# Patient Record
Sex: FEMALE | Race: WHITE | Employment: UNEMPLOYED | URBAN - NONMETROPOLITAN AREA
[De-identification: names, ages, dates, MRNs, and addresses within clinical notes are randomized per-mention and may not be internally consistent; named-entity substitution may affect disease eponyms.]

---
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
## 2025-04-08 ENCOUNTER — OFFICE VISIT (OUTPATIENT)
Dept: FAMILY MEDICINE CLINIC | Age: 20
End: 2025-04-08
Payer: COMMERCIAL

## 2025-04-08 ENCOUNTER — TELEPHONE (OUTPATIENT)
Dept: FAMILY MEDICINE CLINIC | Age: 20
End: 2025-04-08

## 2025-04-08 VITALS
HEIGHT: 64 IN | BODY MASS INDEX: 36.26 KG/M2 | TEMPERATURE: 98 F | RESPIRATION RATE: 18 BRPM | SYSTOLIC BLOOD PRESSURE: 128 MMHG | HEART RATE: 94 BPM | WEIGHT: 212.4 LBS | OXYGEN SATURATION: 99 % | DIASTOLIC BLOOD PRESSURE: 88 MMHG

## 2025-04-08 DIAGNOSIS — Z00.00 WELL ADULT EXAM: Primary | ICD-10-CM

## 2025-04-08 DIAGNOSIS — Z23 NEED FOR TDAP VACCINATION: ICD-10-CM

## 2025-04-08 PROCEDURE — 99385 PREV VISIT NEW AGE 18-39: CPT

## 2025-04-08 PROCEDURE — 90471 IMMUNIZATION ADMIN: CPT

## 2025-04-08 PROCEDURE — 90715 TDAP VACCINE 7 YRS/> IM: CPT

## 2025-04-08 SDOH — ECONOMIC STABILITY: FOOD INSECURITY: WITHIN THE PAST 12 MONTHS, YOU WORRIED THAT YOUR FOOD WOULD RUN OUT BEFORE YOU GOT MONEY TO BUY MORE.: NEVER TRUE

## 2025-04-08 SDOH — ECONOMIC STABILITY: FOOD INSECURITY: WITHIN THE PAST 12 MONTHS, THE FOOD YOU BOUGHT JUST DIDN'T LAST AND YOU DIDN'T HAVE MONEY TO GET MORE.: NEVER TRUE

## 2025-04-08 ASSESSMENT — PATIENT HEALTH QUESTIONNAIRE - PHQ9
1. LITTLE INTEREST OR PLEASURE IN DOING THINGS: NOT AT ALL
2. FEELING DOWN, DEPRESSED OR HOPELESS: NOT AT ALL
SUM OF ALL RESPONSES TO PHQ QUESTIONS 1-9: 0

## 2025-04-11 ASSESSMENT — ENCOUNTER SYMPTOMS
COUGH: 0
CONSTIPATION: 0
ABDOMINAL PAIN: 0
SHORTNESS OF BREATH: 0
SORE THROAT: 0
NAUSEA: 0
DIARRHEA: 0
VOMITING: 0

## 2025-04-11 NOTE — PROGRESS NOTES
[facility] department [address] [address]  Phone: [phone]  Fax: [phone]    Kaitlin Batista (:  2005) is a 19 y.o. female,{New vs Established:[id number]::\"Established patient\"}, here for evaluation of the following chief complaint(s):  New Patient (Here to establish, needs physical starting nursing school)      Assessment and Plan     Diagnoses and all orders for this visit:  Encounter to establish care  Well adult exam  Need for Tdap vaccination  -     Tdap, BOOSTRIX, (age 10 yrs+), IM      Return in about 1 year (around 2026).      Discussed exam, POCT findings, plan of care, and follow-up at length with patient and/or their caregiver. Reviewed all prescribed and recommended medications, administration and side effects. All questions were addressed and answered with verbalization of understanding. The patient and/or the caregiver was agreeable with the plan.   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    Pretty healthy overall. No prescription medications. No acute problems today. No birth control. Sexually active. No menstrual problems.     Review of Systems    Past Medical History: No past medical history on file.     Past Surgical History:  has no past surgical history on file.     Allergies: No Known Allergies    Social History:  reports that she has never smoked. She has never used smokeless tobacco. She reports that she does not drink alcohol and does not use drugs.     Current Outpatient Medications   Medication Sig Dispense Refill    ibuprofen (ADVIL;MOTRIN) 200 MG tablet Take 1 tablet by mouth as needed for Pain      acetaminophen (TYLENOL) 500 MG tablet Take 1 tablet by mouth every 6 hours as needed for Pain       No current facility-administered medications for this visit.       Objective:     Vitals:    25 1037 25 1108   BP: (!) 130/90 128/88   Pulse: 94    Resp: 18    Temp: 98 °F (36.7 
(around 4/8/2026).           --Tristan Thrasher, APRN - CNP

## 2025-07-22 ENCOUNTER — HOSPITAL ENCOUNTER (OUTPATIENT)
Age: 20
Setting detail: SPECIMEN
Discharge: HOME | End: 2025-07-22
Payer: COMMERCIAL

## 2025-07-22 ENCOUNTER — OFFICE VISIT (OUTPATIENT)
Dept: FAMILY MEDICINE CLINIC | Age: 20
End: 2025-07-22
Payer: COMMERCIAL

## 2025-07-22 VITALS
RESPIRATION RATE: 18 BRPM | TEMPERATURE: 97.8 F | DIASTOLIC BLOOD PRESSURE: 80 MMHG | SYSTOLIC BLOOD PRESSURE: 126 MMHG | HEIGHT: 64 IN | OXYGEN SATURATION: 99 % | HEART RATE: 100 BPM | BODY MASS INDEX: 36.4 KG/M2 | WEIGHT: 213.2 LBS

## 2025-07-22 DIAGNOSIS — Z00.00 ROUTINE GENERAL MEDICAL EXAMINATION AT A HEALTH CARE FACILITY: ICD-10-CM

## 2025-07-22 DIAGNOSIS — Z01.84 IMMUNITY STATUS TESTING: Primary | ICD-10-CM

## 2025-07-22 DIAGNOSIS — Z01.84 IMMUNITY STATUS TESTING: ICD-10-CM

## 2025-07-22 PROCEDURE — 87340 HEPATITIS B SURFACE AG IA: CPT

## 2025-07-22 PROCEDURE — 99213 OFFICE O/P EST LOW 20 MIN: CPT

## 2025-07-22 PROCEDURE — 86765 RUBEOLA ANTIBODY: CPT

## 2025-07-22 PROCEDURE — 86787 VARICELLA-ZOSTER ANTIBODY: CPT

## 2025-07-22 PROCEDURE — 86735 MUMPS ANTIBODY: CPT

## 2025-07-22 PROCEDURE — 86762 RUBELLA ANTIBODY: CPT

## 2025-07-22 NOTE — PROGRESS NOTES
[facility] department [address] [address]  Phone: [phone]  Fax: [phone]    Kaitlin Batista (:  2005) is a 20 y.o. female,Established patient, here for evaluation of the following chief complaint(s):  Annual Exam (Needs a titer drawn)      Assessment and Plan     Diagnoses and all orders for this visit:  Immunity status testing  -     Varicella Zoster Antibody, IgG; Future  -     Hepatitis B Surface Antigen; Future  -     Rubeola Antibody, IgG; Future  -     Mumps Antibody, IgG; Future  -     Rubella antibody, IgG; Future  -     Hepatitis B Surface Antibody; Future  Routine general medical examination at a health care facility  -     Varicella Zoster Antibody, IgG; Future  -     Hepatitis B Surface Antigen; Future  -     Rubeola Antibody, IgG; Future  -     Mumps Antibody, IgG; Future  -     Rubella antibody, IgG; Future  -     Hepatitis B Surface Antibody; Future    Patient is well-appearing and in no acute distress at this time.  Physical examination is normal.  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  These were drawn in office today.  Follow-up as scheduled.  Return if symptoms worsen or fail to improve.      Discussed exam, POCT findings, plan of care, and follow-up at length with patient and/or their caregiver. Reviewed all prescribed and recommended medications, administration and side effects. All questions were addressed and answered with verbalization of understanding. The patient and/or the caregiver was agreeable with the plan.   Subjective:   Patient is a 20-year-old female who presents office today for labs and physical.  She was seen in April as a new patient to establish care and for physical for nursing school.  She starts nursing school in the fall.  She presents with the physical form that she needs filled out and needs titers for MMR, varicella,

## 2025-07-23 LAB
HBV SURFACE AG SERPL QL IA: NONREACTIVE
RUBV IGG SERPL QL IA: 135 IU/ML

## 2025-07-23 ASSESSMENT — ENCOUNTER SYMPTOMS
ABDOMINAL PAIN: 0
VOMITING: 0
SHORTNESS OF BREATH: 0
COUGH: 0
DIARRHEA: 0
CONSTIPATION: 0
SORE THROAT: 0

## 2025-07-25 LAB
MEV IGG SER-ACNC: 2.87
MUV IGG SER IA-ACNC: 2.13
VZV IGG SER QL IA: 2.98